# Patient Record
Sex: FEMALE | Race: WHITE | HISPANIC OR LATINO | Employment: STUDENT | URBAN - METROPOLITAN AREA
[De-identification: names, ages, dates, MRNs, and addresses within clinical notes are randomized per-mention and may not be internally consistent; named-entity substitution may affect disease eponyms.]

---
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
## 2024-05-14 ENCOUNTER — TELEPHONE (OUTPATIENT)
Dept: OBSTETRICS AND GYNECOLOGY | Facility: CLINIC | Age: 19
End: 2024-05-14
Payer: MEDICAID

## 2024-05-14 NOTE — TELEPHONE ENCOUNTER
----- Message from Won Carson sent at 5/14/2024 12:48 PM CDT -----  Contact: Mary  Type:  Patient Returning Call    Who Called:Mary  Who Left Message for Patient:nurse  Does the patient know what this is regarding?:missed call  Would the patient rather a call back or a response via [facility]? call  Best Call Back Number:[phone]   Additional Information: pt was scheduled wrong, trying to schedule for STD testing for tomorrow

## 2024-05-14 NOTE — TELEPHONE ENCOUNTER
Returned pt call confirmed pt identifies pt agreed to come in at an earlier time stated she had her annual exam done 9/2023 at the [facility] can pull it up on her mychart if needed stated she just wanted std testing at tomorrow's visit also stated she has new insurance and will bring card to have it updated in our system

## 2024-05-15 ENCOUNTER — PATIENT MESSAGE (OUTPATIENT)
Dept: OBSTETRICS AND GYNECOLOGY | Facility: CLINIC | Age: 19
End: 2024-05-15
Payer: MEDICAID

## 2024-05-15 ENCOUNTER — TELEPHONE (OUTPATIENT)
Dept: OBSTETRICS AND GYNECOLOGY | Facility: CLINIC | Age: 19
End: 2024-05-15
Payer: MEDICAID

## 2024-05-15 NOTE — TELEPHONE ENCOUNTER
Attempted to contact pt no answer lvm regarding appt, [employer] sent. Appt cancelled.

## 2024-05-24 ENCOUNTER — PATIENT MESSAGE (OUTPATIENT)
Dept: OBSTETRICS AND GYNECOLOGY | Facility: CLINIC | Age: 19
End: 2024-05-24
Payer: MEDICAID

## 2024-06-26 ENCOUNTER — TELEPHONE (OUTPATIENT)
Dept: OBSTETRICS AND GYNECOLOGY | Facility: CLINIC | Age: 19
End: 2024-06-26
Payer: MEDICAID

## 2024-06-26 NOTE — TELEPHONE ENCOUNTER
----- Message from Nader Burch sent at 6/26/2024 11:39 AM CDT -----  Type:  Appointment Request         Name of Caller:pt  When is the first available appointment?No access  Symptoms:heavy bleeding  Would the patient rather a call back or a response via [facility]? call  Best Call Back Number:[phone]   Additional Information: Pt states she has been on her cycle for 20 days as of today. Pt states the bleeding gets heavier everyday. Please call pt with further info and assistance. Thank you.

## 2024-06-26 NOTE — TELEPHONE ENCOUNTER
Called patient and scheduled appointment.  Has been seen at [facility] in the past.  Patient has Nexplanon and has experienced bleeding.  [facility] gave her b/c pills to help with bleeding and it did help.  Once she stopped, bleeding continued.

## 2024-06-26 NOTE — TELEPHONE ENCOUNTER
----- Message from Nader Burch sent at 6/26/2024 11:39 AM CDT -----  Type:  Appointment Request         Name of Caller:pt  When is the first available appointment?No access  Symptoms:heavy bleeding  Would the patient rather a call back or a response via [facility]? call  Best Call Back Number:[phone]   Additional Information: Pt states she has been on her cycle for 20 days as of today. Pt states the bleeding gets heavier everyday. Please call pt with further info and assistance. Thank you.

## 2024-07-02 ENCOUNTER — OFFICE VISIT (OUTPATIENT)
Dept: OBSTETRICS AND GYNECOLOGY | Facility: CLINIC | Age: 19
End: 2024-07-02
Payer: MEDICAID

## 2024-07-02 VITALS
DIASTOLIC BLOOD PRESSURE: 68 MMHG | WEIGHT: 155.44 LBS | BODY MASS INDEX: 28.61 KG/M2 | HEIGHT: 62 IN | SYSTOLIC BLOOD PRESSURE: 122 MMHG

## 2024-07-02 DIAGNOSIS — Z97.5 BREAKTHROUGH BLEEDING ON NEXPLANON: Primary | ICD-10-CM

## 2024-07-02 DIAGNOSIS — N92.1 BREAKTHROUGH BLEEDING ON NEXPLANON: Primary | ICD-10-CM

## 2024-07-02 DIAGNOSIS — B00.9 HSV INFECTION: ICD-10-CM

## 2024-07-02 PROCEDURE — 99213 OFFICE O/P EST LOW 20 MIN: CPT | Mod: PBBFAC

## 2024-07-02 PROCEDURE — 3078F DIAST BP <80 MM HG: CPT | Mod: CPTII,,,

## 2024-07-02 PROCEDURE — 99999 PR PBB SHADOW E&M-EST. PATIENT-LVL III: CPT | Mod: PBBFAC,,,

## 2024-07-02 PROCEDURE — 1160F RVW MEDS BY RX/DR IN RCRD: CPT | Mod: CPTII,,,

## 2024-07-02 PROCEDURE — 1159F MED LIST DOCD IN RCRD: CPT | Mod: CPTII,,,

## 2024-07-02 PROCEDURE — 3008F BODY MASS INDEX DOCD: CPT | Mod: CPTII,,,

## 2024-07-02 PROCEDURE — 99213 OFFICE O/P EST LOW 20 MIN: CPT | Mod: S$PBB,,,

## 2024-07-02 PROCEDURE — 3074F SYST BP LT 130 MM HG: CPT | Mod: CPTII,,,

## 2024-07-02 RX ORDER — VALACYCLOVIR HYDROCHLORIDE 1 G/1
TABLET, FILM COATED ORAL
COMMUNITY
Start: 2024-03-12

## 2024-07-02 RX ORDER — ETONOGESTREL 68 MG/1
IMPLANT SUBCUTANEOUS
COMMUNITY
Start: 2023-09-08

## 2024-07-02 NOTE — PATIENT INSTRUCTIONS
It is an incurable recurrent disease that is treatable with medications (Zovirax, Famvir or Valtrex to name a few). It is possible to transmit the virus while in an asymptomatic stage as well as during acute symptoms (recurrence or outbreak). Thus, avoidance of sex during symptomatic phases is best and the use of a condom between spouses during asymptomatic phases is a personal but not mandatory choice. The current recommendation is to use medication at the time of outbreak for the relief of symptoms unless you have frequent outbreaks or are in a relationship with an unaffected partner. In those scenarios you should use daily suppression to decrease recurrence and to decrease the risk of transmission. Please let me know if you have any additional questions.     Ambika Craft, NP

## 2024-07-02 NOTE — PROGRESS NOTES
Subjective:       Patient ID: Mary Anderson is a 18 y.o. female.    Chief Complaint:  Vaginal Bleeding      History of Present Illness  Vaginal Bleeding  Her past medical history is significant for menorrhagia.       Patient presents with complaints of irregular bleeding with Nexplanon in place   Nexplanon inserted 10/2023 at [facility], she reports irregular bleeding since insertion.   Was previously prescribed OCP for 3 months and bleeding resolved from January to March.   She is now back with irregular bleeding reporting last episode lasting 24 days  She has not been sexually active since March, when she also received HSV diagnosed   She has been on daily Valtrex therapy and reports no problems since initial outbreak      GYN & OB History  Patient's last menstrual period was 2024 (exact date).   Date of Last Pap: No result found    OB History    Para Term  AB Living   0 0 0 0 0 0   SAB IAB Ectopic Multiple Live Births   0 0 0 0 0       Review of Systems  Review of Systems   Genitourinary:  Positive for menorrhagia and menstrual problem.           Objective:      Physical Exam:   Constitutional: She is oriented to person, place, and time. She appears well-developed and well-nourished.    HENT:   Head: Normocephalic and atraumatic.    Eyes: Pupils are equal, round, and reactive to light. Conjunctivae and EOM are normal.     Cardiovascular:  Normal rate, regular rhythm and normal heart sounds.             Pulmonary/Chest: Effort normal.        Abdominal: Soft. There is no abdominal tenderness.     Genitourinary:    Genitourinary Comments: deferred             Musculoskeletal: Normal range of motion and moves all extremeties.       Neurological: She is alert and oriented to person, place, and time.    Skin: Skin is warm and dry. She is not diaphoretic.    Psychiatric: She has a normal mood and affect. Her behavior is normal. Judgment and thought content normal.             Assessment:         1. Breakthrough bleeding on Nexplanon    2. HSV infection               Plan:   Aware pap smears start at 19   Since bleeding has resolved for now patient will continue with Nexplanon until 10/2024 (one full year since insertion) and then consider removal.   OCPs ordered, instructed to start pill pack if bleeding exceeds beyond 10 days     Diagnosis and orders this visit:  Breakthrough bleeding on Nexplanon  -     norethindrone-ethinyl estradiol (ORTHO-NOVUM 1-35 TAB,NORTREL 1-35 TAB) 1-35 mg-mcg per tablet; Take 1 tablet by mouth once daily.  Dispense: 28 tablet; Refill: 2    HSV infection  The patient was extensively counseled on Genital Herpes. It is an incurable recurrent disease that is treatable with medications (Zovirax, Famvir or Valtrex to name a few). The pt was counseled that it is possible to transmit the virus while in an asymptomatic stage as well as during acute symptoms (recurrence or outbreak).  Thus, avoidance of sex during symptomatic phases is best and the use of a condom between spouses during asymptomatic phases is a personal but not mandatory choice. The patient indicates understanding of these issues. The current recommendation is to use medication at the time of outbreak for the relief of symptoms unless she has frequent outbreaks or is in a relationship with an unaffected partner.  In those scenarios she should use daily suppression to decrease recurrence and to decrease the risk of transmission.           Ambika Craft NP

## 2024-07-08 NOTE — TELEPHONE ENCOUNTER
----- Message from Nader Burch sent at 6/26/2024 11:39 AM CDT -----  Type:  Appointment Request         Name of Caller:pt  When is the first available appointment?No access  Symptoms:heavy bleeding  Would the patient rather a call back or a response via [facility]? call  Best Call Back Number:[phone]   Additional Information: Pt states she has been on her cycle for 20 days as of today. Pt states the bleeding gets heavier everyday. Please call pt with further info and assistance. Thank you.  
Duplicate message.  
X Size Of Lesion In Cm (Optional): 0
Detail Level: Simple
Introduction Text (Please End With A Colon): The following procedure was deferred: LN2 vs shave biopsy.  Lesion examined by video conference and unable due to image if could be treated with LN2 or will require shave biopsy

## 2024-08-18 ENCOUNTER — PATIENT MESSAGE (OUTPATIENT)
Dept: OBSTETRICS AND GYNECOLOGY | Facility: CLINIC | Age: 19
End: 2024-08-18
Payer: MEDICAID

## 2024-08-20 ENCOUNTER — LAB VISIT (OUTPATIENT)
Dept: LAB | Facility: HOSPITAL | Age: 19
End: 2024-08-20
Attending: NURSE PRACTITIONER
Payer: MEDICAID

## 2024-08-20 ENCOUNTER — OFFICE VISIT (OUTPATIENT)
Dept: PRIMARY CARE CLINIC | Facility: CLINIC | Age: 19
End: 2024-08-20
Payer: MEDICAID

## 2024-08-20 VITALS
HEIGHT: 62 IN | WEIGHT: 155.81 LBS | DIASTOLIC BLOOD PRESSURE: 60 MMHG | SYSTOLIC BLOOD PRESSURE: 110 MMHG | BODY MASS INDEX: 28.67 KG/M2 | TEMPERATURE: 99 F | OXYGEN SATURATION: 99 % | HEART RATE: 81 BPM

## 2024-08-20 DIAGNOSIS — Z13.1 SCREENING FOR DIABETES MELLITUS: ICD-10-CM

## 2024-08-20 DIAGNOSIS — Z11.8 SCREENING FOR CHLAMYDIAL DISEASE: ICD-10-CM

## 2024-08-20 DIAGNOSIS — Z00.00 GENERAL MEDICAL EXAM: Primary | ICD-10-CM

## 2024-08-20 DIAGNOSIS — Z13.220 ENCOUNTER FOR LIPID SCREENING FOR CARDIOVASCULAR DISEASE: ICD-10-CM

## 2024-08-20 DIAGNOSIS — Z13.6 ENCOUNTER FOR LIPID SCREENING FOR CARDIOVASCULAR DISEASE: ICD-10-CM

## 2024-08-20 DIAGNOSIS — Z11.59 ENCOUNTER FOR HEPATITIS C SCREENING TEST FOR LOW RISK PATIENT: ICD-10-CM

## 2024-08-20 DIAGNOSIS — Z00.00 GENERAL MEDICAL EXAM: ICD-10-CM

## 2024-08-20 DIAGNOSIS — Z11.4 SCREENING FOR HIV (HUMAN IMMUNODEFICIENCY VIRUS): ICD-10-CM

## 2024-08-20 DIAGNOSIS — M54.17 LUMBOSACRAL RADICULOPATHY: ICD-10-CM

## 2024-08-20 PROCEDURE — 3074F SYST BP LT 130 MM HG: CPT | Mod: CPTII,,, | Performed by: NURSE PRACTITIONER

## 2024-08-20 PROCEDURE — 84439 ASSAY OF FREE THYROXINE: CPT | Performed by: NURSE PRACTITIONER

## 2024-08-20 PROCEDURE — 84443 ASSAY THYROID STIM HORMONE: CPT | Performed by: NURSE PRACTITIONER

## 2024-08-20 PROCEDURE — 1160F RVW MEDS BY RX/DR IN RCRD: CPT | Mod: CPTII,,, | Performed by: NURSE PRACTITIONER

## 2024-08-20 PROCEDURE — 86803 HEPATITIS C AB TEST: CPT | Performed by: NURSE PRACTITIONER

## 2024-08-20 PROCEDURE — 83036 HEMOGLOBIN GLYCOSYLATED A1C: CPT | Performed by: NURSE PRACTITIONER

## 2024-08-20 PROCEDURE — 36415 COLL VENOUS BLD VENIPUNCTURE: CPT | Mod: PN | Performed by: NURSE PRACTITIONER

## 2024-08-20 PROCEDURE — 99999 PR PBB SHADOW E&M-EST. PATIENT-LVL III: CPT | Mod: PBBFAC,,, | Performed by: NURSE PRACTITIONER

## 2024-08-20 PROCEDURE — 87389 HIV-1 AG W/HIV-1&-2 AB AG IA: CPT | Performed by: NURSE PRACTITIONER

## 2024-08-20 PROCEDURE — 80053 COMPREHEN METABOLIC PANEL: CPT | Performed by: NURSE PRACTITIONER

## 2024-08-20 PROCEDURE — 99204 OFFICE O/P NEW MOD 45 MIN: CPT | Mod: S$PBB,,, | Performed by: NURSE PRACTITIONER

## 2024-08-20 PROCEDURE — 1159F MED LIST DOCD IN RCRD: CPT | Mod: CPTII,,, | Performed by: NURSE PRACTITIONER

## 2024-08-20 PROCEDURE — 80061 LIPID PANEL: CPT | Performed by: NURSE PRACTITIONER

## 2024-08-20 PROCEDURE — 84481 FREE ASSAY (FT-3): CPT | Performed by: NURSE PRACTITIONER

## 2024-08-20 PROCEDURE — 99213 OFFICE O/P EST LOW 20 MIN: CPT | Mod: PBBFAC,PN | Performed by: NURSE PRACTITIONER

## 2024-08-20 PROCEDURE — 3008F BODY MASS INDEX DOCD: CPT | Mod: CPTII,,, | Performed by: NURSE PRACTITIONER

## 2024-08-20 PROCEDURE — 3078F DIAST BP <80 MM HG: CPT | Mod: CPTII,,, | Performed by: NURSE PRACTITIONER

## 2024-08-20 PROCEDURE — 85025 COMPLETE CBC W/AUTO DIFF WBC: CPT | Performed by: NURSE PRACTITIONER

## 2024-08-20 NOTE — PROGRESS NOTES
Subjective:       Patient ID: Mary Anderson is a 18 y.o. female.    Chief Complaint: Wellness (Wellness check up blood work as well.)      History of Present Illness:   Mary Anderson 18 y.o. female presents today to address care gaps and establish care. Denies any other problems or concerns at this time. Treatment options and alternatives were discussed with the patient. Patient provided opportunity to ask additional questions.  All questions were answered. Voices understanding and acceptance of this advice. Instructed to call back if any further questions or concerns.   History reviewed. No pertinent past medical history.  Family History   Problem Relation Name Age of Onset    Hypertension Father      Diabetes Mother      Hypertension Mother       Social History     Socioeconomic History    Marital status: Single   Tobacco Use    Smoking status: Never    Smokeless tobacco: Never   Substance and Sexual Activity    Alcohol use: Never    Drug use: Never    Sexual activity: Not Currently     Social Determinants of Health     Financial Resource Strain: Low Risk  (8/18/2024)    Overall Financial Resource Strain (CARDIA)     Difficulty of Paying Living Expenses: Not hard at all   Food Insecurity: No Food Insecurity (8/18/2024)    Hunger Vital Sign     Worried About Running Out of Food in the Last Year: Never true     Ran Out of Food in the Last Year: Never true   Transportation Needs: No Transportation Needs (5/15/2024)    PRAPARE - Transportation     Lack of Transportation (Medical): No     Lack of Transportation (Non-Medical): No   Physical Activity: Sufficiently Active (8/18/2024)    Exercise Vital Sign     Days of Exercise per Week: 4 days     Minutes of Exercise per Session: 150+ min   Stress: No Stress Concern Present (8/18/2024)    [employer] [place] [employer] - Occupational Stress Questionnaire     Feeling of Stress : Not at all   Housing Stability: Unknown (8/18/2024)    Housing Stability Vital  "Sign     Unable to Pay for Housing in the Last Year: No     Outpatient Encounter Medications as of 2024   Medication Sig Dispense Refill    etonogestreL (NEXPLANON) 68 mg Impl subdermal device       norethindrone-ethinyl estradiol (ORTHO-NOVUM 1-35 TAB,NORTREL 1-35 TAB) 1-35 mg-mcg per tablet Take 1 tablet by mouth once daily. 28 tablet 2    valACYclovir (VALTREX) 1000 MG tablet SMARTSI Tablet(s) By Mouth Morning-Night       No facility-administered encounter medications on file as of 2024.       Review of Systems   Constitutional:  Negative for appetite change, chills and fever.   HENT:  Negative for ear pain, sinus pressure, sore throat and trouble swallowing.    Eyes:  Negative for visual disturbance.   Respiratory:  Negative for shortness of breath.    Cardiovascular:  Negative for chest pain.   Gastrointestinal:  Negative for abdominal pain, diarrhea, nausea and vomiting.   Endocrine: Negative for cold intolerance, polyphagia and polyuria.   Genitourinary:  Negative for decreased urine volume and dysuria.   Musculoskeletal:  Negative for back pain.   Skin:  Negative for rash.   Allergic/Immunologic: Negative for environmental allergies and food allergies.   Neurological:  Negative for dizziness, tremors, weakness and numbness.   Hematological:  Does not bruise/bleed easily.   Psychiatric/Behavioral:  Negative for confusion and hallucinations. The patient is not nervous/anxious and is not hyperactive.    All other systems reviewed and are negative.      Objective:      /60 (BP Location: Left arm, Patient Position: Sitting)   Pulse 81   Temp 98.9 °F (37.2 °C) (Oral)   Ht 5' 2" (1.575 m)   Wt 70.7 kg (155 lb 12.8 oz)   LMP 2024   SpO2 99%   BMI 28.50 kg/m²   Physical Exam  Vitals and nursing note reviewed.   Constitutional:       Appearance: She is well-developed.   HENT:      Head: Normocephalic and atraumatic.      Right Ear: External ear normal.      Left Ear: External ear " normal.      Nose: Nose normal.   Eyes:      Conjunctiva/sclera: Conjunctivae normal.      Pupils: Pupils are equal, round, and reactive to light.   Cardiovascular:      Rate and Rhythm: Normal rate and regular rhythm.      Heart sounds: Normal heart sounds. No murmur heard.  Pulmonary:      Effort: Pulmonary effort is normal.      Breath sounds: Normal breath sounds. No wheezing.   Abdominal:      General: Bowel sounds are normal.      Palpations: Abdomen is soft.      Tenderness: There is no abdominal tenderness.   Musculoskeletal:         General: Normal range of motion.      Cervical back: Normal range of motion and neck supple.   Skin:     General: Skin is warm and dry.      Findings: No rash.   Neurological:      Mental Status: She is alert and oriented to person, place, and time.      Deep Tendon Reflexes: Reflexes are normal and symmetric.   Psychiatric:         Behavior: Behavior normal.         Thought Content: Thought content normal.         Judgment: Judgment normal.         Results for orders placed or performed during the hospital encounter of 12/31/22   POCT urine pregnancy   Result Value Ref Range    POC Preg Test, Ur Negative Negative     Acceptable Yes    POCT Influenza A/B Molecular   Result Value Ref Range    POC Molecular Influenza A Ag Negative Negative, Not Reported    POC Molecular Influenza B Ag Negative Negative, Not Reported     Acceptable Yes    POCT COVID-19 Rapid Screening   Result Value Ref Range    POC Rapid COVID Negative Negative     Acceptable Yes      Assessment:       1. General medical exam    2. Encounter for lipid screening for cardiovascular disease    3. Encounter for hepatitis C screening test for low risk patient    4. Encounter for prostate cancer screening    5. Screening for HIV (human immunodeficiency virus)    6. Screening for diabetes mellitus    7. Lumbosacral radiculopathy    8. Screening for chlamydial disease         Plan:   General medical exam  -     CBC Auto Differential; Future; Expected date: 08/20/2024  -     Comprehensive Metabolic Panel; Future; Expected date: 08/20/2024    Encounter for lipid screening for cardiovascular disease  -     Lipid Panel; Future; Expected date: 08/20/2024    Encounter for hepatitis C screening test for low risk patient  -     Hepatitis C Antibody; Future; Expected date: 08/20/2024    Encounter for prostate cancer screening    Screening for HIV (human immunodeficiency virus)  -     HIV 1/2 Ag/Ab (4th Gen); Future; Expected date: 08/20/2024    Screening for diabetes mellitus  -     Hemoglobin A1C; Future; Expected date: 08/20/2024  -     TSH; Future; Expected date: 08/20/2024  -     T3, Free; Future; Expected date: 08/20/2024  -     T4, Free; Future; Expected date: 08/20/2024    Lumbosacral radiculopathy    Screening for chlamydial disease  -     C. trachomatis/N. gonorrhoeae by AMP DNA; Future; Expected date: 08/20/2024              [facility]   [address]  [place] [address]  Office [phone]  Fax [phone]

## 2024-08-21 LAB
ALBUMIN SERPL BCP-MCNC: 4.1 G/DL (ref 3.5–5.2)
ALP SERPL-CCNC: 36 U/L (ref 55–135)
ALT SERPL W/O P-5'-P-CCNC: 8 U/L (ref 10–44)
ANION GAP SERPL CALC-SCNC: 7 MMOL/L (ref 8–16)
AST SERPL-CCNC: 13 U/L (ref 10–40)
BASOPHILS # BLD AUTO: 0.03 K/UL (ref 0–0.2)
BASOPHILS NFR BLD: 0.3 % (ref 0–1.9)
BILIRUB SERPL-MCNC: 0.5 MG/DL (ref 0.1–1)
BUN SERPL-MCNC: 4 MG/DL (ref 6–20)
CALCIUM SERPL-MCNC: 9.1 MG/DL (ref 8.7–10.5)
CHLORIDE SERPL-SCNC: 109 MMOL/L (ref 95–110)
CHOLEST SERPL-MCNC: 167 MG/DL (ref 120–199)
CHOLEST/HDLC SERPL: 3.3 {RATIO} (ref 2–5)
CO2 SERPL-SCNC: 22 MMOL/L (ref 23–29)
CREAT SERPL-MCNC: 0.7 MG/DL (ref 0.5–1.4)
DIFFERENTIAL METHOD BLD: ABNORMAL
EOSINOPHIL # BLD AUTO: 0.2 K/UL (ref 0–0.5)
EOSINOPHIL NFR BLD: 2.1 % (ref 0–8)
ERYTHROCYTE [DISTWIDTH] IN BLOOD BY AUTOMATED COUNT: 12.9 % (ref 11.5–14.5)
EST. GFR  (NO RACE VARIABLE): >60 ML/MIN/1.73 M^2
ESTIMATED AVG GLUCOSE: 91 MG/DL (ref 68–131)
GLUCOSE SERPL-MCNC: 71 MG/DL (ref 70–110)
HBA1C MFR BLD: 4.8 % (ref 4–5.6)
HCT VFR BLD AUTO: 43.8 % (ref 37–48.5)
HCV AB SERPL QL IA: NORMAL
HDLC SERPL-MCNC: 51 MG/DL (ref 40–75)
HDLC SERPL: 30.5 % (ref 20–50)
HGB BLD-MCNC: 12.6 G/DL (ref 12–16)
HIV 1+2 AB+HIV1 P24 AG SERPL QL IA: NORMAL
IMM GRANULOCYTES # BLD AUTO: 0.02 K/UL (ref 0–0.04)
IMM GRANULOCYTES NFR BLD AUTO: 0.2 % (ref 0–0.5)
LDLC SERPL CALC-MCNC: 102.6 MG/DL (ref 63–159)
LYMPHOCYTES # BLD AUTO: 2.6 K/UL (ref 1–4.8)
LYMPHOCYTES NFR BLD: 27.7 % (ref 18–48)
MCH RBC QN AUTO: 29.4 PG (ref 27–31)
MCHC RBC AUTO-ENTMCNC: 28.8 G/DL (ref 32–36)
MCV RBC AUTO: 102 FL (ref 82–98)
MONOCYTES # BLD AUTO: 0.5 K/UL (ref 0.3–1)
MONOCYTES NFR BLD: 5.6 % (ref 4–15)
NEUTROPHILS # BLD AUTO: 5.9 K/UL (ref 1.8–7.7)
NEUTROPHILS NFR BLD: 64.1 % (ref 38–73)
NONHDLC SERPL-MCNC: 116 MG/DL
NRBC BLD-RTO: 0 /100 WBC
PLATELET # BLD AUTO: 276 K/UL (ref 150–450)
PMV BLD AUTO: 11.4 FL (ref 9.2–12.9)
POTASSIUM SERPL-SCNC: 4.3 MMOL/L (ref 3.5–5.1)
PROT SERPL-MCNC: 7.3 G/DL (ref 6–8.4)
RBC # BLD AUTO: 4.29 M/UL (ref 4–5.4)
SODIUM SERPL-SCNC: 138 MMOL/L (ref 136–145)
T3FREE SERPL-MCNC: 3.6 PG/ML (ref 2.3–4.2)
T4 FREE SERPL-MCNC: 0.95 NG/DL (ref 0.71–1.51)
TRIGL SERPL-MCNC: 67 MG/DL (ref 30–150)
TSH SERPL DL<=0.005 MIU/L-ACNC: 1.93 UIU/ML (ref 0.4–4)
WBC # BLD AUTO: 9.22 K/UL (ref 3.9–12.7)

## 2024-10-06 ENCOUNTER — HOSPITAL ENCOUNTER (EMERGENCY)
Facility: HOSPITAL | Age: 19
Discharge: HOME | End: 2024-10-07
Attending: STUDENT IN AN ORGANIZED HEALTH CARE EDUCATION/TRAINING PROGRAM
Payer: MEDICAID

## 2024-10-06 VITALS
SYSTOLIC BLOOD PRESSURE: 136 MMHG | DIASTOLIC BLOOD PRESSURE: 81 MMHG | BODY MASS INDEX: 28.52 KG/M2 | HEART RATE: 103 BPM | OXYGEN SATURATION: 99 % | TEMPERATURE: 99 F | WEIGHT: 155 LBS | RESPIRATION RATE: 14 BRPM | HEIGHT: 62 IN

## 2024-10-06 DIAGNOSIS — R03.0 ELEVATED BLOOD PRESSURE READING: Primary | ICD-10-CM

## 2024-10-06 PROCEDURE — 99281 EMR DPT VST MAYX REQ PHY/QHP: CPT

## 2024-10-06 NOTE — Clinical Note
"Mary Krugerle" Monica was seen and treated in our emergency department on 10/6/2024.  She may return to school on 10/08/2024.      If you have any questions or concerns, please don't hesitate to call.      Neyda La RN RN"

## 2024-10-07 NOTE — DISCHARGE INSTRUCTIONS
Follow-up with your primary care doctor by calling for appointment.    Current guidelines from the American heart Association, the American College of Emergency physicians in the American board of emergency medicine recommended against starting blood pressure control medications in the ED. while pressures in the 140s are not where it is ideal for you to live long-term, it is not likely related to any of the symptoms that we discussed here today.  I would recommend however taking your blood pressure twice daily, once in the morning once in the evening, each after lying down for several minutes and relaxing, and keeping a log in order to see where you were spending most of your time, as it is normal to have high blood pressures at time that there is pain, fear, or excessive fatigue.     You can also make lifestyle adjustments such as avoiding salty foods, and caffeine, in order to optimize your blood pressure at home.    Please follow up with primary care to discuss if blood pressure medicine should be started.  They may want to do further tests, such as checking blood flow into your kidneys on an ultrasound, as well as other blood work that often has to be done at certain times of the day.    Return to the emergency department if condition worsens in any way, such as developing chest pain, or difficulty breathing.

## 2024-10-07 NOTE — ED PROVIDER NOTES
Encounter Date: 10/6/2024       History     Chief Complaint   Patient presents with    Hypertension     Patient states she goes to the dermatologist every 3 weeks and past few visits BP has been high. Family has been monitoring BP all weekend and has run high. BP normal in triage. +HA and nausea       Hpi  18-year-old female with no pertinent medical history presents brought in by self through triage for her elevated blood pressure readings.  See MDM for further descriptors  Review of patient's allergies indicates:   Allergen Reactions    Cat's claw (uncaria tomentosa) Other (See Comments)     Red eyes and seasonal allergy like symptoms     History reviewed. No pertinent past medical history.  History reviewed. No pertinent surgical history.  Family History   Problem Relation Name Age of Onset    Hypertension Father      Diabetes Mother      Hypertension Mother       Social History     Tobacco Use    Smoking status: Never    Smokeless tobacco: Never   Substance Use Topics    Alcohol use: Never    Drug use: Never   Medical surgical family and social history reviewed  Review of Systems  Complete review of systems was conducted and was negative except as per HPI and as marked  Reports some intermittent headaches the last few weeks but does not have a headache right now.  Reports some intermittent nausea the last few weeks but is not nauseous right  Physical Exam     Initial Vitals   BP Pulse Resp Temp SpO2   10/06/24 2321 10/06/24 2321 10/06/24 2321 10/06/24 2323 10/06/24 2321   136/81 103 14 99 °F (37.2 °C) 99 %      MAP       --                Physical Exam  Physical  General: Awake, alert, no acute distress  Head: Normocephalic, atraumatic  Neck: Trachea midline, full range of motion, no meningismus  ENT: Atraumatic, Airway Patent  Cardio: Regular Rate, Regular Rhythm, Heart Sounds Normal, Capillary refill normal  Chest: Atraumatic, No respiratory distress no use of accessory muscles to breath, normal rate, sounds  even and clear to auscultation  Abdomen: Soft, Nontender, no involuntary guarding, rigidity, or rebound.  Upper Ext: Atraumatic, Inspection normal, no swelling  Lower Ext: Atraumatic, Inspection normal, no swelling  Neuro: No gross cranial nerve abnormality, no lateralization, no gross sensory or motor deficits  ED Course   Procedures  Labs Reviewed   POCT URINE PREGNANCY          Imaging Results    None          Medications - No data to display  Medical Decision Making                 18-year-old female no pertinent medical history presents for elevated blood pressure readings at home.  She is hemodynamically stable in no acute distress, well-appearing, and asymptomatic in the ED. She had reported some intermittent headaches and nausea over the last few weeks, however is entirely asymptomatic at this time.  I offered labs in regards to those which were politely declined, which I likewise agree is entirely medically reasonable as she has does not have these right now.  This was followed by a prolonged discussion of the management or lack thereof of asymptomatic hypertension in the emergency department as based on guidelines from both ACE P and the AHA and counseled on need for primary care follow up.  Of note, there was 1 reading at home that was 154/114, however all other readings discussed fall within the prehypertensive range at highest.    [place] language translation services were offered but declined for this encounter.                   Clinical Impression:  Final diagnoses:  [R03.0] Elevated blood pressure reading (Primary)          ED Disposition Condition    Discharge Stable          ED Prescriptions    None       Follow-up Information    None          Vincenzo Veliz MD  10/07/24 2572

## 2024-10-09 ENCOUNTER — OFFICE VISIT (OUTPATIENT)
Dept: PRIMARY CARE CLINIC | Facility: CLINIC | Age: 19
End: 2024-10-09
Payer: MEDICAID

## 2024-10-09 VITALS
DIASTOLIC BLOOD PRESSURE: 62 MMHG | TEMPERATURE: 99 F | HEIGHT: 62 IN | OXYGEN SATURATION: 97 % | WEIGHT: 154 LBS | SYSTOLIC BLOOD PRESSURE: 142 MMHG | BODY MASS INDEX: 28.34 KG/M2 | HEART RATE: 95 BPM

## 2024-10-09 DIAGNOSIS — I10 ESSENTIAL HYPERTENSION: Primary | ICD-10-CM

## 2024-10-09 PROCEDURE — 3008F BODY MASS INDEX DOCD: CPT | Mod: CPTII,,, | Performed by: NURSE PRACTITIONER

## 2024-10-09 PROCEDURE — 3044F HG A1C LEVEL LT 7.0%: CPT | Mod: CPTII,,, | Performed by: NURSE PRACTITIONER

## 2024-10-09 PROCEDURE — 1159F MED LIST DOCD IN RCRD: CPT | Mod: CPTII,,, | Performed by: NURSE PRACTITIONER

## 2024-10-09 PROCEDURE — 1160F RVW MEDS BY RX/DR IN RCRD: CPT | Mod: CPTII,,, | Performed by: NURSE PRACTITIONER

## 2024-10-09 PROCEDURE — 3078F DIAST BP <80 MM HG: CPT | Mod: CPTII,,, | Performed by: NURSE PRACTITIONER

## 2024-10-09 PROCEDURE — 99213 OFFICE O/P EST LOW 20 MIN: CPT | Mod: PBBFAC,PN | Performed by: NURSE PRACTITIONER

## 2024-10-09 PROCEDURE — 99999 PR PBB SHADOW E&M-EST. PATIENT-LVL III: CPT | Mod: PBBFAC,,, | Performed by: NURSE PRACTITIONER

## 2024-10-09 PROCEDURE — 99214 OFFICE O/P EST MOD 30 MIN: CPT | Mod: S$PBB,,, | Performed by: NURSE PRACTITIONER

## 2024-10-09 PROCEDURE — 3077F SYST BP >= 140 MM HG: CPT | Mod: CPTII,,, | Performed by: NURSE PRACTITIONER

## 2024-10-09 RX ORDER — CHLORTHALIDONE 25 MG/1
25 TABLET ORAL DAILY
Qty: 30 TABLET | Refills: 3 | Status: SHIPPED | OUTPATIENT
Start: 2024-10-09 | End: 2025-10-09

## 2024-10-09 RX ORDER — AMLODIPINE BESYLATE 2.5 MG/1
2.5 TABLET ORAL DAILY
Qty: 90 TABLET | Refills: 0 | Status: SHIPPED | OUTPATIENT
Start: 2024-10-09 | End: 2025-01-07

## 2024-10-20 NOTE — PROGRESS NOTES
Subjective:       Patient ID: Mary Anderson is a 18 y.o. female.    Chief Complaint: Follow-up (Pt stated her blood pressure has been elevated. )      History of Present Illness:   Mary Anderson 18 y.o. female presents to clinic for medication management and refills for HTN, Denies any other problems or concerns at this time. Treatment options and alternatives were discussed with the patient. Patient provided opportunity to ask additional questions.  All questions were answered. Voices understanding and acceptance of this advice. Instructed to call back if any further questions or concerns.     History reviewed. No pertinent past medical history.  Family History   Problem Relation Name Age of Onset    Hypertension Father      Diabetes Mother      Hypertension Mother       Social History     Socioeconomic History    Marital status: Single   Tobacco Use    Smoking status: Never    Smokeless tobacco: Never   Substance and Sexual Activity    Alcohol use: Never    Drug use: Never    Sexual activity: Not Currently     Social Drivers of Health     Financial Resource Strain: Low Risk  (8/18/2024)    Overall Financial Resource Strain (CARDIA)     Difficulty of Paying Living Expenses: Not hard at all   Food Insecurity: No Food Insecurity (8/18/2024)    Hunger Vital Sign     Worried About Running Out of Food in the Last Year: Never true     Ran Out of Food in the Last Year: Never true   Transportation Needs: No Transportation Needs (5/15/2024)    PRAPARE - Transportation     Lack of Transportation (Medical): No     Lack of Transportation (Non-Medical): No   Physical Activity: Sufficiently Active (8/18/2024)    Exercise Vital Sign     Days of Exercise per Week: 4 days     Minutes of Exercise per Session: 150+ min   Stress: No Stress Concern Present (8/18/2024)    [employer] - Occupational Stress Questionnaire     Feeling of Stress : Not at all   Housing Stability: Unknown (8/18/2024)    Housing  "Stability Vital Sign     Unable to Pay for Housing in the Last Year: No     Outpatient Encounter Medications as of 10/9/2024   Medication Sig Dispense Refill    etonogestreL (NEXPLANON) 68 mg Impl subdermal device       norethindrone-ethinyl estradiol (ORTHO-NOVUM 1-35 TAB,NORTREL 1-35 TAB) 1-35 mg-mcg per tablet Take 1 tablet by mouth once daily. 28 tablet 2    amLODIPine (NORVASC) 2.5 MG tablet Take 1 tablet (2.5 mg total) by mouth once daily. 90 tablet 0    chlorthalidone (HYGROTEN) 25 MG Tab Take 1 tablet (25 mg total) by mouth once daily. 30 tablet 3    valACYclovir (VALTREX) 1000 MG tablet SMARTSI Tablet(s) By Mouth Morning-Night (Patient not taking: Reported on 10/9/2024)       No facility-administered encounter medications on file as of 10/9/2024.       Review of Systems   Constitutional:  Negative for appetite change, chills and fever.   HENT:  Negative for ear pain, sinus pressure, sore throat and trouble swallowing.    Eyes:  Negative for visual disturbance.   Respiratory:  Negative for shortness of breath.    Cardiovascular:  Negative for chest pain.   Gastrointestinal:  Negative for abdominal pain, diarrhea, nausea and vomiting.   Endocrine: Negative for cold intolerance, polyphagia and polyuria.   Genitourinary:  Negative for decreased urine volume and dysuria.   Musculoskeletal:  Negative for back pain.   Skin:  Negative for rash.   Allergic/Immunologic: Negative for environmental allergies and food allergies.   Neurological:  Negative for dizziness, tremors, weakness and numbness.   Hematological:  Does not bruise/bleed easily.   Psychiatric/Behavioral:  Negative for confusion and hallucinations. The patient is not nervous/anxious and is not hyperactive.    All other systems reviewed and are negative.      Objective:      BP (!) 142/62 (BP Location: Left arm, Patient Position: Sitting)   Pulse 95   Temp 99.2 °F (37.3 °C) (Oral)   Ht 5' 2" (1.575 m)   Wt 69.9 kg (154 lb)   LMP 2024 " (Exact Date)   SpO2 97%   BMI 28.17 kg/m²   Physical Exam  Constitutional:       Appearance: She is well-developed.   HENT:      Head: Normocephalic.      Right Ear: Tympanic membrane normal.      Left Ear: Tympanic membrane normal.      Nose: Nose normal.   Eyes:      Pupils: Pupils are equal, round, and reactive to light.   Cardiovascular:      Rate and Rhythm: Normal rate.      Heart sounds: Normal heart sounds.   Pulmonary:      Effort: Pulmonary effort is normal.      Breath sounds: Normal breath sounds.   Abdominal:      General: Bowel sounds are normal.      Palpations: Abdomen is soft.   Musculoskeletal:         General: Normal range of motion.   Skin:     General: Skin is warm and dry.   Neurological:      Mental Status: She is alert and oriented to person, place, and time.   Psychiatric:         Behavior: Behavior normal.         Results for orders placed or performed in visit on 08/20/24   C. trachomatis/N. gonorrhoeae by AMP DNA    Collection Time: 08/20/24  3:43 PM   Result Value Ref Range    Chlamydia, Amplified DNA Not Detected Not Detected    N gonorrhoeae, amplified DNA Not Detected Not Detected     Assessment:       1. Essential hypertension        Plan:   Essential hypertension  -     chlorthalidone (HYGROTEN) 25 MG Tab; Take 1 tablet (25 mg total) by mouth once daily.  Dispense: 30 tablet; Refill: 3  -     amLODIPine (NORVASC) 2.5 MG tablet; Take 1 tablet (2.5 mg total) by mouth once daily.  Dispense: 90 tablet; Refill: 0              [facility]   [address]  [place] [address]  Office [phone]  Fax [phone]

## 2024-10-29 ENCOUNTER — CLINICAL SUPPORT (OUTPATIENT)
Dept: PRIMARY CARE CLINIC | Facility: CLINIC | Age: 19
End: 2024-10-29
Payer: MEDICAID

## 2024-10-29 VITALS — DIASTOLIC BLOOD PRESSURE: 62 MMHG | SYSTOLIC BLOOD PRESSURE: 102 MMHG

## 2024-10-29 DIAGNOSIS — I10 ESSENTIAL HYPERTENSION: Primary | ICD-10-CM

## 2025-01-28 ENCOUNTER — OFFICE VISIT (OUTPATIENT)
Dept: PRIMARY CARE CLINIC | Facility: CLINIC | Age: 20
End: 2025-01-28
Payer: MEDICAID

## 2025-01-28 VITALS
SYSTOLIC BLOOD PRESSURE: 118 MMHG | OXYGEN SATURATION: 98 % | HEIGHT: 62 IN | HEART RATE: 99 BPM | BODY MASS INDEX: 27.97 KG/M2 | DIASTOLIC BLOOD PRESSURE: 72 MMHG | TEMPERATURE: 99 F | WEIGHT: 152 LBS

## 2025-01-28 DIAGNOSIS — I10 ESSENTIAL HYPERTENSION: Primary | ICD-10-CM

## 2025-01-28 PROCEDURE — 99999 PR PBB SHADOW E&M-EST. PATIENT-LVL IV: CPT | Mod: PBBFAC,,, | Performed by: NURSE PRACTITIONER

## 2025-01-28 PROCEDURE — 99214 OFFICE O/P EST MOD 30 MIN: CPT | Mod: PBBFAC,PN | Performed by: NURSE PRACTITIONER

## 2025-01-28 PROCEDURE — 99214 OFFICE O/P EST MOD 30 MIN: CPT | Mod: S$PBB,,, | Performed by: NURSE PRACTITIONER

## 2025-01-28 PROCEDURE — 3078F DIAST BP <80 MM HG: CPT | Mod: CPTII,,, | Performed by: NURSE PRACTITIONER

## 2025-01-28 PROCEDURE — 1160F RVW MEDS BY RX/DR IN RCRD: CPT | Mod: CPTII,,, | Performed by: NURSE PRACTITIONER

## 2025-01-28 PROCEDURE — 3074F SYST BP LT 130 MM HG: CPT | Mod: CPTII,,, | Performed by: NURSE PRACTITIONER

## 2025-01-28 PROCEDURE — 1159F MED LIST DOCD IN RCRD: CPT | Mod: CPTII,,, | Performed by: NURSE PRACTITIONER

## 2025-01-28 PROCEDURE — 3008F BODY MASS INDEX DOCD: CPT | Mod: CPTII,,, | Performed by: NURSE PRACTITIONER

## 2025-01-28 RX ORDER — AMLODIPINE BESYLATE 5 MG/1
5 TABLET ORAL DAILY
COMMUNITY
Start: 2025-01-02 | End: 2025-02-04 | Stop reason: SDUPTHER

## 2025-02-04 RX ORDER — CHLORTHALIDONE 25 MG/1
25 TABLET ORAL DAILY
Qty: 30 TABLET | Refills: 3 | Status: SHIPPED | OUTPATIENT
Start: 2025-02-04 | End: 2026-02-04

## 2025-02-04 RX ORDER — AMLODIPINE BESYLATE 5 MG/1
5 TABLET ORAL DAILY
Qty: 30 TABLET | Refills: 2 | Status: SHIPPED | OUTPATIENT
Start: 2025-02-04 | End: 2025-03-06

## 2025-02-04 NOTE — PROGRESS NOTES
Subjective:       Patient ID: Mary Anderson is a 19 y.o. female.    Chief Complaint: Follow-up (Blood pressure was elevated over the holidays, Pt stated she seen a doctor at [facility] and he increased the amLOdipine to 5mg,pt stated B/P has been fine since. )      History of Present Illness:   Mary Anderson 19 y.o. female presents today with the following:   History of Present Illness    CHIEF COMPLAINT:  Ms. Anderson presents today for medication management of hypertension.    HYPERTENSION:  She reports good response to amlodipine since dose increase from 2.5 mg to 5 mg by urgent care provider.      ROS:  General: -fever, -chills, -fatigue, -weight gain, -weight loss, -loss of appetite  Eyes: -vision changes, -blurry vision, -eye pain, -eye discharge  ENT: -ear pain, -hearing loss, -tinnitus, -nasal congestion, -sore throat  Cardiovascular: -chest pain, -palpitations, -lower extremity edema  Respiratory: -cough, -shortness of breath, -wheezing, -sputum production  Endocrine: -polyuria, -polydipsia, -heat intolerance, -cold intolerance  Gastrointestinal: -abdominal pain, -heartburn, -nausea, -vomiting, -diarrhea, -constipation, -blood in stool  Genitourinary: -dysuria, -urgency, -frequency, -hematuria, -nocturia, -incontinence  Heme & Lymphatic: -easy or excessive bleeding, -easy bruising, -swollen lymph nodes  Musculoskeletal: -muscle pain, -back pain, -joint pain, -joint swelling  Skin: -rash, -lesion, -itching, -skin texture changes, -skin color changes  Neurological: -headache, -dizziness, -numbness, -tingling, -seizure activity, -speech difficulty, -memory loss, -confusion  Psychiatric: -anxiety, -depression, -sleep difficulty        History reviewed. No pertinent past medical history.  Family History   Problem Relation Name Age of Onset    Hypertension Father      Diabetes Mother      Hypertension Mother       Social History     Socioeconomic History    Marital status: Single   Tobacco Use    Smoking  status: Never    Smokeless tobacco: Never   Substance and Sexual Activity    Alcohol use: Never    Drug use: Never    Sexual activity: Not Currently     Social Drivers of Health     Financial Resource Strain: Low Risk  (2024)    Overall Financial Resource Strain (CARDIA)     Difficulty of Paying Living Expenses: Not hard at all   Food Insecurity: No Food Insecurity (2024)    Hunger Vital Sign     Worried About Running Out of Food in the Last Year: Never true     Ran Out of Food in the Last Year: Never true   Transportation Needs: No Transportation Needs (5/15/2024)    PRAPARE - Transportation     Lack of Transportation (Medical): No     Lack of Transportation (Non-Medical): No   Physical Activity: Sufficiently Active (2024)    Exercise Vital Sign     Days of Exercise per Week: 4 days     Minutes of Exercise per Session: 150+ min   Stress: No Stress Concern Present (2024)    [employer] - Occupational Stress Questionnaire     Feeling of Stress : Not at all   Housing Stability: Unknown (2024)    Housing Stability Vital Sign     Unable to Pay for Housing in the Last Year: No     Outpatient Encounter Medications as of 2025   Medication Sig Dispense Refill    etonogestreL (NEXPLANON) 68 mg Impl subdermal device       norethindrone-ethinyl estradiol (ORTHO-NOVUM 1-35 TAB,NORTREL 1-35 TAB) 1-35 mg-mcg per tablet Take 1 tablet by mouth once daily. 28 tablet 2    [DISCONTINUED] amLODIPine (NORVASC) 5 MG tablet Take 5 mg by mouth once daily.      [DISCONTINUED] chlorthalidone (HYGROTEN) 25 MG Tab Take 1 tablet (25 mg total) by mouth once daily. 30 tablet 3    amLODIPine (NORVASC) 5 MG tablet Take 1 tablet (5 mg total) by mouth once daily. 30 tablet 2    chlorthalidone (HYGROTEN) 25 MG Tab Take 1 tablet (25 mg total) by mouth once daily. 30 tablet 3    valACYclovir (VALTREX) 1000 MG tablet SMARTSI Tablet(s) By Mouth Morning-Night (Patient not taking: Reported on  "1/28/2025)      [DISCONTINUED] amLODIPine (NORVASC) 2.5 MG tablet Take 1 tablet (2.5 mg total) by mouth once daily. 90 tablet 0     No facility-administered encounter medications on file as of 1/28/2025.           Objective:      /72 (BP Location: Left arm, Patient Position: Sitting)   Pulse 99   Temp 98.6 °F (37 °C) (Oral)   Ht 5' 2" (1.575 m)   Wt 68.9 kg (152 lb)   LMP 01/05/2025   SpO2 98%   BMI 27.80 kg/m²   Physical Exam    Physical Exam  Constitutional:       General: She is not in acute distress.     Appearance: Normal appearance. She is normal weight.   HENT:      Head: Normocephalic.      Right Ear: Tympanic membrane normal.      Left Ear: Tympanic membrane normal.      Mouth/Throat:      Mouth: Mucous membranes are moist.   Eyes:      General: No scleral icterus.  Cardiovascular:      Rate and Rhythm: Normal rate and regular rhythm.      Heart sounds: No murmur heard.  Pulmonary:      Effort: Pulmonary effort is normal.      Breath sounds: Normal breath sounds.   Abdominal:      General: Bowel sounds are normal.      Palpations: Abdomen is soft.      Tenderness: There is no abdominal tenderness. There is no right CVA tenderness or left CVA tenderness.   Musculoskeletal:         General: No swelling or tenderness.      Cervical back: Normal range of motion and neck supple.      Right lower leg: No edema.      Left lower leg: No edema.   Skin:     General: Skin is warm and dry.   Neurological:      General: No focal deficit present.      Mental Status: She is alert and oriented to person, place, and time.      Cranial Nerves: No cranial nerve deficit.      Gait: Gait normal.   Psychiatric:         Mood and Affect: Mood normal.         Behavior: Behavior normal.         Thought Content: Thought content normal.         Judgment: Judgment normal.                Results for orders placed or performed in visit on 08/20/24   C. trachomatis/N. gonorrhoeae by AMP DNA    Collection Time: 08/20/24  3:43 " PM   Result Value Ref Range    Chlamydia, Amplified DNA Not Detected Not Detected    N gonorrhoeae, amplified DNA Not Detected Not Detected     Assessment:       1. Essential hypertension    Assessment & Plan    I10 Essential (primary) hypertension    HYPERTENSION:  - Assessed the patient's response to increased amlodipine dose; noted good blood pressure control at 5 mg dose.  - Measured blood pressure today, which was 118/72.  - Continued amlodipine 5 mg daily for blood pressure control.  - Instructed the patient to keep a blood pressure log.  - Evaluated current medication dosage and determined it to be effective.    FOLLOW UP:  - Scheduled a follow-up visit in 3 months for evaluation or as needed.        Plan:   Essential hypertension  -     chlorthalidone (HYGROTEN) 25 MG Tab; Take 1 tablet (25 mg total) by mouth once daily.  Dispense: 30 tablet; Refill: 3    Other orders  -     amLODIPine (NORVASC) 5 MG tablet; Take 1 tablet (5 mg total) by mouth once daily.  Dispense: 30 tablet; Refill: 2      Today's encounter took a total time of 20 minutes, and that time included Preparing to see the patient (review records, tests), Obtaining and/or reviewing separately obtained historical data, Performing a medically appropriate examination and/or evaluation , Counseling & educating the patient/family/caregiver on treatment plan with chronic health conditions , ordering medications, tests, and/or procedures, Referring and communicating with other healthcare professionals , Documenting clinical information in the electronic or other health record, Independently interpreting results & communicating results to the patient/family/caregiver.           [facility]   [address] [place] [address] [address]  [place] [address]  Office [phone]  Fax [phone]   This note was generated with the assistance of ambient listening technology. Verbal consent was obtained by the patient and accompanying  visitor(s) for the recording of patient appointment to facilitate this note. I attest to having reviewed and edited the generated note for accuracy, though some syntax or spelling errors may persist. Please contact the author of this note for any clarification.

## 2025-04-09 ENCOUNTER — PATIENT OUTREACH (OUTPATIENT)
Dept: ADMINISTRATIVE | Facility: HOSPITAL | Age: 20
End: 2025-04-09
Payer: MEDICAID

## 2025-05-06 ENCOUNTER — LAB VISIT (OUTPATIENT)
Dept: LAB | Facility: HOSPITAL | Age: 20
End: 2025-05-06
Attending: NURSE PRACTITIONER
Payer: MEDICAID

## 2025-05-06 ENCOUNTER — OFFICE VISIT (OUTPATIENT)
Dept: PRIMARY CARE CLINIC | Facility: CLINIC | Age: 20
End: 2025-05-06
Payer: MEDICAID

## 2025-05-06 VITALS
HEIGHT: 62 IN | BODY MASS INDEX: 27.6 KG/M2 | SYSTOLIC BLOOD PRESSURE: 100 MMHG | WEIGHT: 150 LBS | OXYGEN SATURATION: 97 % | DIASTOLIC BLOOD PRESSURE: 60 MMHG | TEMPERATURE: 99 F | HEART RATE: 99 BPM

## 2025-05-06 DIAGNOSIS — I10 ESSENTIAL HYPERTENSION: Primary | ICD-10-CM

## 2025-05-06 DIAGNOSIS — I10 ESSENTIAL HYPERTENSION: ICD-10-CM

## 2025-05-06 DIAGNOSIS — B35.1 FUNGAL INFECTION OF TOENAIL: ICD-10-CM

## 2025-05-06 LAB
ALBUMIN SERPL BCP-MCNC: 4 G/DL (ref 3.5–5.2)
ALP SERPL-CCNC: 43 UNIT/L (ref 40–150)
ALT SERPL W/O P-5'-P-CCNC: 11 UNIT/L (ref 10–44)
ANION GAP (OHS): 10 MMOL/L (ref 8–16)
AST SERPL-CCNC: 15 UNIT/L (ref 11–45)
BILIRUB SERPL-MCNC: 0.3 MG/DL (ref 0.1–1)
BUN SERPL-MCNC: 9 MG/DL (ref 6–20)
CALCIUM SERPL-MCNC: 9.7 MG/DL (ref 8.7–10.5)
CHLORIDE SERPL-SCNC: 98 MMOL/L (ref 95–110)
CO2 SERPL-SCNC: 27 MMOL/L (ref 23–29)
CREAT SERPL-MCNC: 0.7 MG/DL (ref 0.5–1.4)
GFR SERPLBLD CREATININE-BSD FMLA CKD-EPI: >60 ML/MIN/1.73/M2
GLUCOSE SERPL-MCNC: 92 MG/DL (ref 70–110)
POTASSIUM SERPL-SCNC: 3.1 MMOL/L (ref 3.5–5.1)
PROT SERPL-MCNC: 8.4 GM/DL (ref 6–8.4)
SODIUM SERPL-SCNC: 135 MMOL/L (ref 136–145)

## 2025-05-06 PROCEDURE — 99214 OFFICE O/P EST MOD 30 MIN: CPT | Mod: S$PBB,,, | Performed by: NURSE PRACTITIONER

## 2025-05-06 PROCEDURE — 99214 OFFICE O/P EST MOD 30 MIN: CPT | Mod: PBBFAC,PN | Performed by: NURSE PRACTITIONER

## 2025-05-06 PROCEDURE — 3074F SYST BP LT 130 MM HG: CPT | Mod: CPTII,,, | Performed by: NURSE PRACTITIONER

## 2025-05-06 PROCEDURE — 1160F RVW MEDS BY RX/DR IN RCRD: CPT | Mod: CPTII,,, | Performed by: NURSE PRACTITIONER

## 2025-05-06 PROCEDURE — 3078F DIAST BP <80 MM HG: CPT | Mod: CPTII,,, | Performed by: NURSE PRACTITIONER

## 2025-05-06 PROCEDURE — 36415 COLL VENOUS BLD VENIPUNCTURE: CPT | Mod: PN

## 2025-05-06 PROCEDURE — 99999 PR PBB SHADOW E&M-EST. PATIENT-LVL IV: CPT | Mod: PBBFAC,,, | Performed by: NURSE PRACTITIONER

## 2025-05-06 PROCEDURE — 80053 COMPREHEN METABOLIC PANEL: CPT

## 2025-05-06 PROCEDURE — 1159F MED LIST DOCD IN RCRD: CPT | Mod: CPTII,,, | Performed by: NURSE PRACTITIONER

## 2025-05-06 PROCEDURE — 3008F BODY MASS INDEX DOCD: CPT | Mod: CPTII,,, | Performed by: NURSE PRACTITIONER

## 2025-05-06 RX ORDER — TERBINAFINE HYDROCHLORIDE 250 MG/1
250 TABLET ORAL DAILY
Qty: 30 TABLET | Refills: 1 | Status: SHIPPED | OUTPATIENT
Start: 2025-05-06 | End: 2025-06-05

## 2025-05-06 RX ORDER — AMLODIPINE BESYLATE 2.5 MG/1
2.5 TABLET ORAL DAILY
Qty: 90 TABLET | Refills: 1 | Status: SHIPPED | OUTPATIENT
Start: 2025-05-06 | End: 2026-05-06

## 2025-05-07 ENCOUNTER — RESULTS FOLLOW-UP (OUTPATIENT)
Dept: PRIMARY CARE CLINIC | Facility: CLINIC | Age: 20
End: 2025-05-07

## 2025-05-07 DIAGNOSIS — E87.6 HYPOKALEMIA: Primary | ICD-10-CM

## 2025-05-07 RX ORDER — POTASSIUM CHLORIDE 20 MEQ/1
20 TABLET, EXTENDED RELEASE ORAL 2 TIMES DAILY
Qty: 6 TABLET | Refills: 0 | Status: SHIPPED | OUTPATIENT
Start: 2025-05-07 | End: 2025-05-10

## 2025-05-13 NOTE — PROGRESS NOTES
Subjective:       Patient ID: Mary Anderson is a 19 y.o. female.    Chief Complaint: Medication Refill (Pt requesting for medication to be on a script pad) and Nail Problem (Pt stated that she may have a fungus on toe nails )      History of Present Illness:   Mary Anderson 19 y.o. female presents today for medication management and refill for HTN and also is requesting medication for toenail fungal infection. Patient still has acrylic and gel polish on her nails. Recommend that patient removes the polish so that I can assess the area.  Patient insist on treatment will start on Lamisil. Agree and understands plan. Denies any other problems or concerns at thi time. Treatment options and alternatives were discussed with the patient. Patient provided opportunity to ask additional questions.  All questions were answered. Voices understanding and acceptance of this advice. Instructed to call back if any further questions or concerns.   History of Present Illness             History reviewed. No pertinent past medical history.  Family History   Problem Relation Name Age of Onset    Hypertension Father      Diabetes Mother      Hypertension Mother       Social History[1]  Encounter Medications[2]    Review of Systems   Constitutional:  Negative for appetite change, chills and fever.   HENT:  Negative for ear pain, sinus pressure, sore throat and trouble swallowing.    Eyes:  Negative for visual disturbance.   Respiratory:  Negative for shortness of breath.    Cardiovascular:  Negative for chest pain.   Gastrointestinal:  Negative for abdominal pain, diarrhea, nausea and vomiting.   Endocrine: Negative for cold intolerance, polyphagia and polyuria.   Genitourinary:  Negative for decreased urine volume and dysuria.   Musculoskeletal:  Negative for back pain.   Skin:  Negative for rash.        Reports toe nail fungal infection to bilateral second toes-can't assess area due to she still has the polish on.     Allergic/Immunologic: Negative for environmental allergies and food allergies.   Neurological:  Negative for dizziness, tremors, weakness and numbness.   Hematological:  Does not bruise/bleed easily.   Psychiatric/Behavioral:  Negative for confusion and hallucinations. The patient is not nervous/anxious and is not hyperactive.    All other systems reviewed and are negative.      Objective:   Physical Exam  Constitutional:       General: She is not in acute distress.     Appearance: She is well-developed. She is not diaphoretic.   HENT:      Head: Normocephalic.      Right Ear: Tympanic membrane, ear canal and external ear normal.      Left Ear: Tympanic membrane, ear canal and external ear normal.      Nose: Nose normal. No mucosal edema or rhinorrhea.      Right Sinus: No maxillary sinus tenderness or frontal sinus tenderness.      Left Sinus: No maxillary sinus tenderness or frontal sinus tenderness.      Mouth/Throat:      Pharynx: Uvula midline. No oropharyngeal exudate or posterior oropharyngeal erythema.   Eyes:      Conjunctiva/sclera: Conjunctivae normal.   Cardiovascular:      Rate and Rhythm: Normal rate and regular rhythm.      Heart sounds: Normal heart sounds.   Pulmonary:      Effort: Pulmonary effort is normal. No tachypnea, bradypnea, accessory muscle usage or respiratory distress.      Breath sounds: Normal breath sounds. No decreased breath sounds, wheezing, rhonchi or rales.   Musculoskeletal:      Cervical back: Normal range of motion and neck supple.   Lymphadenopathy:      Head:      Right side of head: No submental, submandibular or tonsillar adenopathy.      Left side of head: No submental, submandibular or tonsillar adenopathy.      Cervical: No cervical adenopathy.   Skin:     General: Skin is warm and dry.   Neurological:      Mental Status: She is alert and oriented to person, place, and time.         /60 (BP Location: Right arm, Patient Position: Sitting)   Pulse 99   Temp 98.7  "°F (37.1 °C) (Oral)   Ht 5' 2" (1.575 m)   Wt 68 kg (150 lb)   LMP 04/23/2025   SpO2 97%   BMI 27.44 kg/m²   Physical Exam               Results for orders placed or performed in visit on 08/20/24   C. trachomatis/N. gonorrhoeae by AMP DNA    Collection Time: 08/20/24  3:43 PM   Result Value Ref Range    Chlamydia, Amplified DNA Not Detected Not Detected    N gonorrhoeae, amplified DNA Not Detected Not Detected     Assessment:       1. Essential hypertension    2. Fungal infection of toenail    Assessment & Plan             Plan:   Essential hypertension  -     amLODIPine (NORVASC) 2.5 MG tablet; Take 1 tablet (2.5 mg total) by mouth once daily.  Dispense: 90 tablet; Refill: 1  -     Comprehensive Metabolic Panel; Future; Expected date: 05/06/2025    Fungal infection of toenail  -     Comprehensive Metabolic Panel; Future; Expected date: 05/06/2025  -     terbinafine HCL (LAMISIL) 250 mg tablet; Take 1 tablet (250 mg total) by mouth once daily.  Dispense: 30 tablet; Refill: 1       Additional Interventions  Foot Hygiene: Instruct patients to wash feet daily with soap and water, ensuring thorough drying, especially between toes. Avoid acrylic polish and gel polish   Antifungal Treatment: Apply prescribed topical antifungal medications to affected areas as directed.   Footwear Recommendations: Advise wearing moisture-wicking socks and breathable footwear; recommend antifungal powders for shoes.   Environmental Precautions: Encourage wearing sandals or flip-flops in public showers and pools to prevent reinfection.   Monitoring: Observe for signs of secondary bacterial infections or complications.     Today's encounter took a total time of 20 minutes, and that time included Preparing to see the patient (review records, tests), Obtaining and/or reviewing separately obtained historical data, Performing a medically appropriate examination and/or evaluation , Counseling & educating the patient/family/caregiver on " treatment plan with chronic health conditions , ordering medications, tests, and/or procedures, Referring and communicating with other healthcare professionals , Documenting clinical information in the electronic or other health record, Independently interpreting results & communicating results to the patient/family/caregiver.           [facility]   [address]  [place] [address]  Office [phone]  Fax [phone]   This note was generated with the assistance of ambient listening technology. Verbal consent was obtained by the patient and accompanying visitor(s) for the recording of patient appointment to facilitate this note. I attest to having reviewed and edited the generated note for accuracy, though some syntax or spelling errors may persist. Please contact the author of this note for any clarification.            [1]  Social History  Socioeconomic History    Marital status: Single   Tobacco Use    Smoking status: Never    Smokeless tobacco: Never   Substance and Sexual Activity    Alcohol use: Never    Drug use: Never    Sexual activity: Not Currently     Social Drivers of Health     Financial Resource Strain: Low Risk  (5/5/2025)    Overall Financial Resource Strain (CARDIA)     Difficulty of Paying Living Expenses: Not hard at all   Food Insecurity: No Food Insecurity (5/5/2025)    Hunger Vital Sign     Worried About Running Out of Food in the Last Year: Never true     Ran Out of Food in the Last Year: Never true   Transportation Needs: No Transportation Needs (5/5/2025)    PRAPARE - Transportation     Lack of Transportation (Medical): No     Lack of Transportation (Non-Medical): No   Physical Activity: Insufficiently Active (5/5/2025)    Exercise Vital Sign     Days of Exercise per Week: 3 days     Minutes of Exercise per Session: 40 min   Stress: No Stress Concern Present (5/5/2025)    [employer] -  Occupational Stress Questionnaire     Feeling of Stress : Only a little   Housing Stability: Low Risk  (2025)    Housing Stability Vital Sign     Unable to Pay for Housing in the Last Year: No     Homeless in the Last Year: No   [2]  Outpatient Encounter Medications as of 2025   Medication Sig Dispense Refill    norethindrone-ethinyl estradiol (ORTHO-NOVUM 1-35 TAB,NORTREL 1-35 TAB) 1-35 mg-mcg per tablet Take 1 tablet by mouth once daily. 28 tablet 2    [DISCONTINUED] amLODIPine (NORVASC) 5 MG tablet Take 1 tablet (5 mg total) by mouth once daily. 30 tablet 2    [DISCONTINUED] chlorthalidone (HYGROTEN) 25 MG Tab Take 1 tablet (25 mg total) by mouth once daily. 30 tablet 3    amLODIPine (NORVASC) 2.5 MG tablet Take 1 tablet (2.5 mg total) by mouth once daily. 90 tablet 1    etonogestreL (NEXPLANON) 68 mg Impl subdermal device       terbinafine HCL (LAMISIL) 250 mg tablet Take 1 tablet (250 mg total) by mouth once daily. 30 tablet 1    valACYclovir (VALTREX) 1000 MG tablet SMARTSI Tablet(s) By Mouth Morning-Night (Patient not taking: Reported on 10/9/2024)       No facility-administered encounter medications on file as of 2025.

## 2025-06-18 ENCOUNTER — PATIENT MESSAGE (OUTPATIENT)
Dept: RESEARCH | Facility: HOSPITAL | Age: 20
End: 2025-06-18
Payer: MEDICAID